# Patient Record
Sex: FEMALE | Race: WHITE
[De-identification: names, ages, dates, MRNs, and addresses within clinical notes are randomized per-mention and may not be internally consistent; named-entity substitution may affect disease eponyms.]

---

## 2018-03-03 ENCOUNTER — HOSPITAL ENCOUNTER (EMERGENCY)
Dept: HOSPITAL 80 - FED | Age: 58
Discharge: HOME | End: 2018-03-03
Payer: COMMERCIAL

## 2018-03-03 VITALS
RESPIRATION RATE: 18 BRPM | HEART RATE: 79 BPM | OXYGEN SATURATION: 96 % | SYSTOLIC BLOOD PRESSURE: 135 MMHG | DIASTOLIC BLOOD PRESSURE: 85 MMHG

## 2018-03-03 VITALS — TEMPERATURE: 98.4 F

## 2018-03-03 DIAGNOSIS — M79.602: Primary | ICD-10-CM

## 2018-03-03 DIAGNOSIS — R06.4: ICD-10-CM

## 2018-03-03 LAB — PLATELET # BLD: 348 10^3/UL (ref 150–400)

## 2018-03-03 NOTE — CPEKG
Heart Rate: 71

RR Interval: 845

P-R Interval: 156

QRSD Interval: 82

QT Interval: 392

QTC Interval: 426

P Axis: 42

QRS Axis: 0

T Wave Axis: -1

EKG Severity - ABNORMAL ECG -

EKG Impression: SINUS RHYTHM

EKG Impression: CONSIDER LEFT VENTRICULAR HYPERTROPHY

EKG Impression: BORDERLINE T ABNORMALITIES, INFERIOR LEADS

Electronically Signed By: Gary Brown 03-Mar-2018 14:16:01

## 2018-03-03 NOTE — EDPHY
H & P


Time Seen by Provider: 03/03/18 11:38


HPI/ROS: 





CHIEF COMPLAINT:  Shortness of breath





HISTORY OF PRESENT ILLNESS:  58-year-old woman presents with which she tells me 

she thought was a panic attack.  She has been having intermittent left upper 

arm soreness all week but she has been lifting weights for about a month and 

increased her weight just before this started she thought she injured her arm.  

It is worse with certain movements and when she pushes on it.  Today around 10:

30 a.m. She started not feeling good and then thought she was having"panic 

attack"which she lasted 15 years ago.  She was short of breath and 

hyperventilating and lightheaded and had tingling and numbness in both hands 

and fingers when she came to the emergency department.  Now that part feels 

completely resolved.  No chest pain and no cough or hemoptysis no leg swelling.

  No recent travel or immobilization.





REVIEW OF SYSTEMS:


Eye: no change in vision


ENT:  Sore throat earlier this week now resolved


Cardiac: no chest pain or syncope


Pulmonary:  HPI


Abdomen: no vomiting, diarrhea, abdominal pain


Musculoskeletal:  HPI


Skin: no rash


Neuro: no headache


Constitutional: no fever


: no urinary symptoms





A comprehensive 10 point review of systems is otherwise negative aside from 

elements mentioned in the history of present illness.





PAST MEDICAL HISTORY:  Negative for diabetes hypercholesterolemia or 

hypertension.


Family history positive for coronary disease in her father in his 50s.


Social history:  Nonsmoker, no drugs, primary care doctor is at [facility].





General Appearance: Alert and conversant, cooperative.


Eyes: No scleral  icterus. 


ENT, Mouth: Normal mucous membranes.  No angioedema.


Respiratory: Normal respiratory effort, breath sounds equal, lungs are clear to 

auscultation.


Cardiovascular:  Regular rate and rhythm.


Gastrointestinal:  Abdomen is soft and non tender.


Neurological: Alert, face symmetric, normal motor and sensory in extremities. 


Skin: Warm and dry, no rashes.


Musculoskeletal: No peripheral edema.  No calf tenderness.


Psychiatric: Not agitated.





Emergency Department course/MDM:





Symptoms were earlier sound typical for hyperventilation and panic attack.  

When I palpate her left upper arm I can squeeze and it reproduces her symptoms.

  This is more likely to be muscular than acute coronary syndrome.  Plan for 0 

and 3 hr Troponin, discharge if negative.





1432:  1st troponin negative, 2nd troponin pending.  2nd EKG similar 1st. 

Results discussed at 1452 including 2nd negative troponin. Low risk HEART score 

with 1 point for age, 1 for risk factor, 1 for EKG.  Stable for discharge, 

outpatient followup.


Smoking Status: Never smoked


Constitutional: 


 Initial Vital Signs











Temperature (C)  37.0 C   03/03/18 11:01


 


Heart Rate  84   03/03/18 11:01


 


Respiratory Rate  20   03/03/18 11:01


 


Blood Pressure  176/97 H  03/03/18 11:01








 











O2 Delivery Mode               Room Air


 


O2 (L/minute)                  97














Allergies/Adverse Reactions: 


 





No Known Allergies Allergy (Unverified 03/03/18 11:01)


 











Medical Decision Making





- Diagnostics


EKG Interpretation: 





12-lead EKG interpreted by me; official reading is in trace master.  My 

interpretation is sinus rhythm with nonspecific anterior T-wave flattening 

otherwise negative.  Rate 85





2nd EKG at 2:14 p.m.:  12-lead EKG interpreted by me; official reading is in 

trace master.  My interpretation is sinus rhythm with LVH and nonspecific T-

wave flattening similar to 1st, rate 71. 


Differential Diagnosis: 





Differential diagnosis considered for shortness of breath including but not 

limited to acute coronary syndrome, pulmonary infectious process, COPD, asthma, 

pulmonary embolus and congestive heart failure.





- Data Points


Laboratory Results: 


 Laboratory Results





 03/03/18 11:10 





 03/03/18 11:10 





 











  03/03/18 03/03/18 03/03/18





  14:10 11:10 11:10


 


WBC      7.80 10^3/uL 10^3/uL





     (3.80-9.50) 


 


RBC      4.89 10^6/uL 10^6/uL





     (4.18-5.33) 


 


Hgb      15.1 g/dL g/dL





     (12.6-16.3) 


 


Hct      43.6 % %





     (38.0-47.0) 


 


MCV      89.2 fL fL





     (81.5-99.8) 


 


MCH      30.9 pg pg





     (27.9-34.1) 


 


MCHC      34.6 g/dL g/dL





     (32.4-36.7) 


 


RDW      13.2 % %





     (11.5-15.2) 


 


Plt Count      348 10^3/uL 10^3/uL





     (150-400) 


 


MPV      9.8 fL fL





     (8.7-11.7) 


 


Neut % (Auto)      56.8 % %





     (39.3-74.2) 


 


Lymph % (Auto)      35.4 % %





     (15.0-45.0) 


 


Mono % (Auto)      5.0 % %





     (4.5-13.0) 


 


Eos % (Auto)      1.4 % %





     (0.6-7.6) 


 


Baso % (Auto)      0.8 % %





     (0.3-1.7) 


 


Nucleat RBC Rel Count      0.0 % %





     (0.0-0.2) 


 


Absolute Neuts (auto)      4.43 10^3/uL 10^3/uL





     (1.70-6.50) 


 


Absolute Lymphs (auto)      2.76 10^3/uL 10^3/uL





     (1.00-3.00) 


 


Absolute Monos (auto)      0.39 10^3/uL 10^3/uL





     (0.30-0.80) 


 


Absolute Eos (auto)      0.11 10^3/uL 10^3/uL





     (0.03-0.40) 


 


Absolute Basos (auto)      0.06 10^3/uL 10^3/uL





     (0.02-0.10) 


 


Absolute Nucleated RBC      0.00 10^3/uL 10^3/uL





     (0-0.01) 


 


Immature Gran %      0.6 % %





     (0.0-1.1) 


 


Immature Gran #      0.05 10^3/uL 10^3/uL





     (0.00-0.10) 


 


Sodium    143 mEq/L mEq/L  





    (135-145)  


 


Potassium    3.8 mEq/L mEq/L  





    (3.5-5.2)  


 


Chloride    109 mEq/L mEq/L  





    ()  


 


Carbon Dioxide    19 mEq/l L mEq/l  





    (22-31)  


 


Anion Gap    15 mEq/L mEq/L  





    (8-16)  


 


BUN    14 mg/dL mg/dL  





    (7-23)  


 


Creatinine    0.8 mg/dL mg/dL  





    (0.6-1.0)  


 


Estimated GFR    > 60   





    


 


Glucose    160 mg/dL H mg/dL  





    ()  


 


Calcium    9.5 mg/dL mg/dL  





    (8.5-10.4)  


 


Troponin I  < 0.012 ng/mL ng/mL  < 0.012 ng/mL ng/mL  





   (0.000-0.034)   (0.000-0.034)  











Medications Given: 


 








Discontinued Medications





Aspirin (Aspirin)  324 mg PO EDNOW ONE


   Stop: 03/03/18 11:16


   Last Admin: 03/03/18 11:18 Dose:  324 mg








Departure





- Departure


Disposition: Home, Routine, Self-Care


Clinical Impression: 


 Left upper arm pain, Hyperventilation





Condition: Good


Instructions:  Hyperventilation (ED)


Referrals: 


TERESA VOGEL [Retired Resigned] - As per Instructions

## 2018-03-03 NOTE — CPEKG
Heart Rate: 85

RR Interval: 706

P-R Interval: 160

QRSD Interval: 90

QT Interval: 384

QTC Interval: 457

P Axis: 46

QRS Axis: 6

T Wave Axis: -7

EKG Severity - BORDERLINE ECG -

EKG Impression: SINUS RHYTHM

EKG Impression: BORDERLINE T WAVE ABNORMALITIES

Electronically Signed By: Gary Brown 03-Mar-2018 11:57:37